# Patient Record
(demographics unavailable — no encounter records)

---

## 2024-12-04 NOTE — ASSESSMENT
[FreeTextEntry1] : 47 yo female s/p back mass excision 11/27/24 doing well drain removed today without difficulty, dressing applied  monitor for redness, swelling, fever, chills no heavy lifting or strenuous activity continue to wear soft, non wire bra she is encouraged to call if there are any changes, or with any questions or concerns  all pt questions answered to the best of my ability Follow up with Dr. Smiley on Monday 12/9

## 2024-12-04 NOTE — PHYSICAL EXAM
[NI] : Normal [de-identified] : incision c/d/i no palpable fluid collections, fluctuance, signs of infection, or dehiscence  drain in place and functioning

## 2024-12-04 NOTE — HISTORY OF PRESENT ILLNESS
[FreeTextEntry1] : Ms. NATY LIRA is a 46 year old female with past medical history of back mass who presents now s/p excision of back mass 11/27/24 States her drain output is low, presents for evaluation Denies fever, chills, LE pain/edema Drain 9